# Patient Record
(demographics unavailable — no encounter records)

---

## 2025-01-06 NOTE — ASSESSMENT
[FreeTextEntry1] : Nasal Vestibulitis rx-Bactroban  DNS and Rhinitis Rx   Steam humidification and hypertonic saline nasal irrigations  Rx- Medrol dospak d/c Astelin  f/u 2 weeks

## 2025-01-06 NOTE — HISTORY OF PRESENT ILLNESS
[de-identified] : 43 yo chr sinus congestion and facial pressure Also c/o snoring and ear popping Has nasal crusting despite tx Pt using Astelin and saline nasal rinses

## 2025-01-06 NOTE — PROCEDURE
[de-identified] :  Anterior Rhinoscopy insufficient to account for symptoms.  After informed verbal consent is obtained, the fiberoptic nasal endoscope #3 is passed via the right nasal cavity. The following anatomic sites were directly examined in a sequential fashion: The scope was introduced in both  nasal passages between the middle and inferior turbinates to exam the inferior portion of the middle meatus and the fontanelle, as well as the maxillary ostia.  Next, the scope was passed medially and posteriorly to the middle turbinates to examine the sphenoethmoid recess and the superior turbinate region.   There is [ 0 ]% obstruction of the nasopharynx with adenoid tissue.  A deviated nasal septum was noted causing obstruction. The turbinates were congested-bilateral.  Right Side: 	Mucosa: wnl 	Mucous: none 	Polyp: none 	Inferior Turbinate: sl congested 	Middle Turbinate:sl congested 	Superior Turbinate: wnl 	Inferior Meatus:clear 	Middle Meatus: clear 	Super Meatus: clear 	Sphenoethmoidal Recess: wnl    Left Side: 	Mucosa: wnl 	Mucous:none 	Polyp: none 	Inferior Turbinate: sl congested 	Middle Turbinate: sl congested 	Superior Turbinate:wnl 	Inferior Meatus: clear 	Middle Meatus: clear 	Super Meatus:clear 	Sphenoethmoidal Recess: wnl   Split-Thickness Skin Graft Text: The defect edges were debeveled with a #15 scalpel blade.  Given the location of the defect, shape of the defect and the proximity to free margins a split thickness skin graft was deemed most appropriate.  Using a sterile surgical marker, the primary defect shape was transferred to the donor site. The split thickness graft was then harvested.  The skin graft was then placed in the primary defect and oriented appropriately.

## 2025-01-06 NOTE — PHYSICAL EXAM
[] : septum deviated bilaterally [de-identified] : dry [de-identified] : dry mucous [Midline] : trachea located in midline position [Normal] : no rashes

## 2025-01-30 NOTE — PROCEDURE
[de-identified] :  Anterior Rhinoscopy insufficient to account for symptoms.  After informed verbal consent is obtained, the fiberoptic nasal endoscope #3 is passed via the right nasal cavity. The following anatomic sites were directly examined in a sequential fashion: The scope was introduced in both  nasal passages between the middle and inferior turbinates to exam the inferior portion of the middle meatus and the fontanelle, as well as the maxillary ostia.  Next, the scope was passed medially and posteriorly to the middle turbinates to examine the sphenoethmoid recess and the superior turbinate region.   There is [ 0 ]% obstruction of the nasopharynx with adenoid tissue.  A deviated nasal septum was noted causing obstruction. The turbinates were congested-bilateral.  Right Side: 	Mucosa: wnl 	Mucous: none 	Polyp: none 	Inferior Turbinate: sl congested 	Middle Turbinate:sl congested 	Superior Turbinate: wnl 	Inferior Meatus:clear 	Middle Meatus: clear 	Super Meatus: clear 	Sphenoethmoidal Recess: wnl    Left Side: 	Mucosa: wnl 	Mucous:none 	Polyp: none 	Inferior Turbinate: sl congested 	Middle Turbinate: sl congested 	Superior Turbinate:wnl 	Inferior Meatus: clear 	Middle Meatus: clear 	Super Meatus:clear 	Sphenoethmoidal Recess: wnl

## 2025-01-30 NOTE — PHYSICAL EXAM
[] : septum deviated bilaterally [de-identified] : dry [de-identified] : dry mucous [Midline] : trachea located in midline position [de-identified] : 4+ cryptic [Normal] : no rashes

## 2025-01-30 NOTE — HISTORY OF PRESENT ILLNESS
[de-identified] : 43 yo chr sinus congestion and facial pressure Also c/o snoring and ear popping Has nasal crusting despite tx Pt using Astelin and saline nasal rinses [FreeTextEntry1] : 1/30/2025 Pt reports sl improvement on nasal spray tx nasal crusting improved w/ bactroban ointyment Also notes spitting up tonsil stones migraine headaches persist no other modifying factors no other nasal or throat complaints

## 2025-04-17 NOTE — PHYSICAL EXAM
[No Acute Distress] : no acute distress [Normal Sclera/Conjunctiva] : normal sclera/conjunctiva [Normal Oropharynx] : the oropharynx was normal [No Edema] : there was no peripheral edema [No Extremity Clubbing/Cyanosis] : no extremity clubbing/cyanosis [Normal] : affect was normal and insight and judgment were intact [de-identified] : Hazy TMs bilateraly, frontal sinus tenderness to palpation

## 2025-04-17 NOTE — PLAN
[FreeTextEntry1] : Ear Discomfort-start Flonase BID.  Migraines-advised to stop Pseudofed-can be contributing to elevated BPs.  Has been taking tylenol and pseudo.  Advised to trial Ibuprofen.  Can start with OTC dosing.  Will send 600mg rx; Frequent migraines-not always aligning with menses. Advising Neurology evaluation. Discussed GI precautions with NSAID use.  Advised taking with food and to avoid overuse.  Discussed risk of stomach irritation, ulcers and gastric bleeding.  Patient expressed understanding.  Fatigue and Heavy Menses-has upcoming GYN appointment. Check CBC, TSH, Electrolytes.   HTN-advised close monitoring-keep log at home.  Has been off medications with close monitoring.  Discussed need to restart if trending up.  Patient requesting STD testing.    Discussed with Ms. SMITH precautions and advised to go to Emergency Room if condition worsened.  Advised any worsening headache symptoms, weakness, facial asymmetry needs immediate eval.  Ms. HILARIO SMITH expressed understanding of the plan.

## 2025-04-17 NOTE — REVIEW OF SYSTEMS
[Earache] : earache [Headache] : headache [Negative] : Gastrointestinal [Fever] : no fever [Chills] : no chills

## 2025-04-17 NOTE — PHYSICAL EXAM
[No Acute Distress] : no acute distress [Normal Sclera/Conjunctiva] : normal sclera/conjunctiva [Normal Oropharynx] : the oropharynx was normal [No Edema] : there was no peripheral edema [No Extremity Clubbing/Cyanosis] : no extremity clubbing/cyanosis [Normal] : affect was normal and insight and judgment were intact [de-identified] : Hazy TMs bilateraly, frontal sinus tenderness to palpation

## 2025-04-17 NOTE — HISTORY OF PRESENT ILLNESS
[FreeTextEntry1] : Here for recurrent migraines. [de-identified] : Here for recurrent migraines. Last bad one on this Sunday. Menstrual cycle also started.  Has been getting worse since December.   Feels in front around eyes. Smell sensitivity, light and sound sensitivity.  Nausea and vomitting with certain smells. Also notes at times face has been feeling numb happens with episodes, has happened on multiple occassions.  Neck pain at times too.  Pressure in the ears. Left ear feels clogged.  Was sweating on Sunday.  Woke up and had migraine.  Took Tylenol sinus- slept the whole day.    LMP this week-; they have been regular the past few month.  Heavy-but regular.  Very heavy.  Seeing GYN on 04/23/25  Planning for US Transvaginal   Saline nasal  Feeling better since sunday-but still with headache today.    Has not seen Neurology recently.  Saw ENT.  Has been taking Pseudofed for sinus congestion.  Has been feeling fatigued.

## 2025-04-17 NOTE — HISTORY OF PRESENT ILLNESS
[FreeTextEntry1] : Here for recurrent migraines. [de-identified] : Here for recurrent migraines. Last bad one on this Sunday. Menstrual cycle also started.  Has been getting worse since December.   Feels in front around eyes. Smell sensitivity, light and sound sensitivity.  Nausea and vomitting with certain smells. Also notes at times face has been feeling numb happens with episodes, has happened on multiple occassions.  Neck pain at times too.  Pressure in the ears. Left ear feels clogged.  Was sweating on Sunday.  Woke up and had migraine.  Took Tylenol sinus- slept the whole day.    LMP this week-; they have been regular the past few month.  Heavy-but regular.  Very heavy.  Seeing GYN on 04/23/25  Planning for US Transvaginal   Saline nasal  Feeling better since sunday-but still with headache today.    Has not seen Neurology recently.  Saw ENT.  Has been taking Pseudofed for sinus congestion.  Has been feeling fatigued.